# Patient Record
Sex: FEMALE
[De-identification: names, ages, dates, MRNs, and addresses within clinical notes are randomized per-mention and may not be internally consistent; named-entity substitution may affect disease eponyms.]

---

## 2018-04-20 ENCOUNTER — HOSPITAL ENCOUNTER (EMERGENCY)
Dept: HOSPITAL 97 - ER | Age: 43
LOS: 1 days | Discharge: HOME | End: 2018-04-21
Payer: COMMERCIAL

## 2018-04-20 DIAGNOSIS — L50.9: Primary | ICD-10-CM

## 2018-04-20 DIAGNOSIS — R06.00: ICD-10-CM

## 2018-04-20 DIAGNOSIS — E83.42: ICD-10-CM

## 2018-04-20 LAB
ALBUMIN SERPL BCP-MCNC: 3.2 G/DL (ref 3.2–5.5)
ALP SERPL-CCNC: 88 IU/L (ref 42–121)
ALT SERPL W P-5'-P-CCNC: 38 IU/L (ref 10–60)
AST SERPL W P-5'-P-CCNC: 39 IU/L (ref 10–42)
BUN BLD-MCNC: 16 MG/DL (ref 6–20)
CKMB CREATINE KINASE MB: 3.3 NG/ML (ref 0.3–4)
GLUCOSE SERPLBLD-MCNC: 108 MG/DL (ref 65–120)
HCT VFR BLD CALC: 36.1 % (ref 36–45)
INR BLD: 0.97
LIPASE SERPL-CCNC: 19 U/L (ref 22–51)
LYMPHOCYTES # SPEC AUTO: 2.6 K/UL (ref 0.7–4.9)
MAGNESIUM SERPL-MCNC: 1.7 MG/DL (ref 1.8–2.5)
MCH RBC QN AUTO: 26.7 PG (ref 27–35)
MCV RBC: 82.5 FL (ref 80–100)
PMV BLD: 8.1 FL (ref 7.6–11.3)
POTASSIUM SERPL-SCNC: 3.6 MEQ/L (ref 3.6–5)
RBC # BLD: 4.37 M/UL (ref 3.86–4.86)

## 2018-04-20 PROCEDURE — 83690 ASSAY OF LIPASE: CPT

## 2018-04-20 PROCEDURE — 80048 BASIC METABOLIC PNL TOTAL CA: CPT

## 2018-04-20 PROCEDURE — 87086 URINE CULTURE/COLONY COUNT: CPT

## 2018-04-20 PROCEDURE — 71045 X-RAY EXAM CHEST 1 VIEW: CPT

## 2018-04-20 PROCEDURE — 96375 TX/PRO/DX INJ NEW DRUG ADDON: CPT

## 2018-04-20 PROCEDURE — 85730 THROMBOPLASTIN TIME PARTIAL: CPT

## 2018-04-20 PROCEDURE — 85610 PROTHROMBIN TIME: CPT

## 2018-04-20 PROCEDURE — 36415 COLL VENOUS BLD VENIPUNCTURE: CPT

## 2018-04-20 PROCEDURE — 87088 URINE BACTERIA CULTURE: CPT

## 2018-04-20 PROCEDURE — 81003 URINALYSIS AUTO W/O SCOPE: CPT

## 2018-04-20 PROCEDURE — 93005 ELECTROCARDIOGRAM TRACING: CPT

## 2018-04-20 PROCEDURE — 85025 COMPLETE CBC W/AUTO DIFF WBC: CPT

## 2018-04-20 PROCEDURE — 82553 CREATINE MB FRACTION: CPT

## 2018-04-20 PROCEDURE — 83735 ASSAY OF MAGNESIUM: CPT

## 2018-04-20 PROCEDURE — 80076 HEPATIC FUNCTION PANEL: CPT

## 2018-04-20 PROCEDURE — 99284 EMERGENCY DEPT VISIT MOD MDM: CPT

## 2018-04-20 PROCEDURE — 84484 ASSAY OF TROPONIN QUANT: CPT

## 2018-04-20 PROCEDURE — 82550 ASSAY OF CK (CPK): CPT

## 2018-04-20 PROCEDURE — 96361 HYDRATE IV INFUSION ADD-ON: CPT

## 2018-04-20 PROCEDURE — 96365 THER/PROPH/DIAG IV INF INIT: CPT

## 2018-04-20 PROCEDURE — 83880 ASSAY OF NATRIURETIC PEPTIDE: CPT

## 2018-04-20 PROCEDURE — 81025 URINE PREGNANCY TEST: CPT

## 2018-04-20 NOTE — EDPHYS
Physician Documentation                                                                           

 [facility]                                                                

Name: Lyla Frank                                                                 

Age: 42 yrs                                                                                       

Sex: Female                                                                                       

: 1975                                                                                   

MRN: [id number]                                                                                   

Arrival Date: 2018                                                                          

Time: 21:25                                                                                       

Account#: [id number]                                                                            

Bed 6                                                                                             

Private MD:                                                                                       

ED Physician Yusef De La Rosa                                                                      

HPI:                                                                                              

                                                                                             

21:50 This 42 yrs old  Female presents to ER via Ambulatory with complaints of Rash,  michael 

      Breathing Difficulty.                                                                       

21:50 The patient's rash thought to be caused by an unknown cause. The rash is located on the michael 

      body diffusely. The rash can be described as erythematous, urticarial. Onset: The           

      symptoms/episode began/occurred 1 day(s) ago. Associated signs and symptoms: Pertinent      

      positives: None. Severity of symptoms: At their worst the symptoms were moderate in the     

      emergency department the symptoms are unchanged. Treatment given at home: none. The         

      patient has not experienced similar symptoms in the past.                                   

                                                                                                  

OB/GYN:                                                                                           

21:30 LMP N/A - Recent birth                                                                  aj  

                                                                                                  

Historical:                                                                                       

- Allergies:                                                                                      

21:30 No Known Allergies;                                                                     aj  

- Home Meds:                                                                                      

21:30 None [Active];                                                                          aj  

- PMHx:                                                                                           

21:30 None;                                                                                   aj  

- PSHx:                                                                                           

21:30 None;                                                                                   aj  

                                                                                                  

- Immunization history:: Adult Immunizations up to date.                                          

- Social history:: Smoking status: Patient/guardian denies using tobacco.                         

- Family history:: not pertinent.                                                                 

                                                                                                  

                                                                                                  

ROS:                                                                                              

21:50 Constitutional: Negative for fever, chills, and weight loss, Eyes: Negative for injury, michael 

      pain, redness, and discharge, ENT: Negative for injury, pain, and discharge, Neck:          

      Negative for injury, pain, and swelling, Cardiovascular: Negative for chest pain,           

      palpitations, and edema, Respiratory: Negative for shortness of breath, cough,              

      wheezing, and pleuritic chest pain, Abdomen/GI: Negative for abdominal pain, nausea,        

      vomiting, diarrhea, and constipation, Back: Negative for injury and pain, : Negative      

      for injury, bleeding, discharge, and swelling, MS/Extremity: Negative for injury and        

      deformity, Neuro: Negative for headache, weakness, numbness, tingling, and seizure,         

      Psych: Negative for depression, anxiety, suicide ideation, homicidal ideation, and          

      hallucinations, Allergy/Immunology: Negative for hives, rash, and allergies, Endocrine:     

      Negative for neck swelling, polydipsia, polyuria, polyphagia, and marked weight             

      changes, Hematologic/Lymphatic: Negative for swollen nodes, abnormal bleeding, and          

      unusual bruising.                                                                           

21:50 Skin: Positive for rash, diffusely.                                                         

                                                                                                  

Exam:                                                                                             

21:50 Constitutional:  This is a well developed, well nourished patient who is awake, alert,  michael 

      and in no acute distress. Head/Face:  Normocephalic, atraumatic. Eyes:  Pupils equal        

      round and reactive to light, extra-ocular motions intact.  Lids and lashes normal.          

      Conjunctiva and sclera are non-icteric and not injected.  Cornea within normal limits.      

      Periorbital areas with no swelling, redness, or edema. ENT:  Nares patent. No nasal         

      discharge, no septal abnormalities noted.  Tympanic membranes are normal and external       

      auditory canals are clear.  Oropharynx with no redness, swelling, or masses, exudates,      

      or evidence of obstruction, uvula midline.  Mucous membranes moist. Neck:  Trachea          

      midline, no thyromegaly or masses palpated, and no cervical lymphadenopathy.  Supple,       

      full range of motion without nuchal rigidity, or vertebral point tenderness.  No            

      Meningismus. Chest/axilla:  Normal chest wall appearance and motion.  Nontender with no     

      deformity.  No lesions are appreciated. Cardiovascular:  Regular rate and rhythm with a     

      normal S1 and S2.  No gallops, murmurs, or rubs.  Normal PMI, no JVD.  No pulse             

      deficits. Respiratory:  Lungs have equal breath sounds bilaterally, clear to                

      auscultation and percussion.  No rales, rhonchi or wheezes noted.  No increased work of     

      breathing, no retractions or nasal flaring. Abdomen/GI:  Soft, non-tender, with normal      

      bowel sounds.  No distension or tympany.  No guarding or rebound.  No evidence of           

      tenderness throughout. Back:  No spinal tenderness.  No costovertebral tenderness.          

      Full range of motion. MS/ Extremity:  Pulses equal, no cyanosis.  Neurovascular intact.     

       Full, normal range of motion. Neuro:  Awake and alert, GCS 15, oriented to person,         

      place, time, and situation.  Cranial nerves II-XII grossly intact.  Motor strength 5/5      

      in all extremities.  Sensory grossly intact.  Cerebellar exam normal.  Normal gait.         

      Psych:  Awake, alert, with orientation to person, place and time.  Behavior, mood, and      

      affect are within normal limits.                                                            

21:50 Skin: Appearance: Color: normal in color, Temperature: normal temperature, Moisture:        

      normal moisture, petechiae, not noted, ecchymosis, not noted, urticaria.                    

23:01 Musculoskeletal/extremity: Extremities: all appear grossly normal, with no appreciated  michael 

      pain with palpation, ROM: full active range of motion, full passive range of motion,        

      Circulation is intact in all extremities. Sensation intact. Compartment Syndrome exam       

      of affected extremity: is normal. DVT Exam: No signs of deep vein thrombosis. no pain,      

      no swelling, no tenderness, negative Homans' sign noted on exam, no appreciated bluish      

      discoloration, no erythema, no increased warmth.                                            

23:04 Chest/axilla: Inspection: normal, Palpation: is normal, no acute changes, Axilla: are   michael 

      normal, no acute changes.                                                                   

23:04 Cardiovascular: Rate: normal, Rhythm: regular, Pulses: Pulses are 4+ in bilateral           

      radial, brachial, femoral, popliteal, posterior tibial and and dorsalis pedis               

      arteries.. Heart sounds: normal, Edema: is not appreciated, JVD: is not appreciated.        

                                                                                                  

Vital Signs:                                                                                      

21:30  / 83; Pulse 79; Resp 22; Temp 98.7; Pulse Ox 97% on R/A; Weight 122.47 kg;       aj  

      Height 5 ft. 3 in. (160.02 cm); Pain 0/10;                                                  

22:58  / 80; Pulse 67; Resp 16; Pulse Ox 95% on R/A; Pain 0/10;                         tl1 

                                                                                             

00:11  / 79; Pulse 65; Resp 16; Temp 98.6; Pulse Ox 96% on R/A; Pain 0/10;              tl1 

                                                                                             

21:30 Body Mass Index 47.83 (122.47 kg, 160.02 cm)                                              

                                                                                                  

MDM:                                                                                              

                                                                                             

21:46 Patient medically screened.                                                             [facility] 

21:52 Data reviewed: vital signs, nurses notes, lab test result(s), EKG, radiologic studies,  [facility] 

      plain films.                                                                                

                                                                                                  

                                                                                             

21:50 Order name: Basic Metabolic Panel; Complete Time: 22:57                                 [facility] 

                                                                                             

21:50 Order name: BNP; Complete Time: 22:57                                                   [facility] 

                                                                                             

21:50 Order name: CBC with Diff; Complete Time: 22:57                                         [facility] 

                                                                                             

21:50 Order name: Ckmb; Complete Time: 22:57                                                  [facility] 

                                                                                             

21:50 Order name: CPK; Complete Time: 22:57                                                   [facility] 

                                                                                             

21:50 Order name: LFT's; Complete Time: 22:57                                                 [facility] 

                                                                                             

21:50 Order name: Magnesium; Complete Time: 22:57                                             [facility] 

                                                                                             

21:50 Order name: PT-INR; Complete Time: 22:57                                                [facility] 

                                                                                             

21:50 Order name: Ptt, Activated; Complete Time: 22:57                                        [facility] 

                                                                                             

21:50 Order name: Troponin (emerg Dept Use Only); Complete Time: 22:57                        [facility] 

                                                                                             

21:50 Order name: Lipase; Complete Time: 22:57                                                [facility] 

                                                                                             

21:53 Order name: Urine Culture                                                               [facility] 

                                                                                             

23:42 Order name: Urine Dipstick--Ancillary (enter results)                                   [facility] 

                                                                                             

23:42 Order name: Urine Pregnancy--Ancillary (enter results)                                  [facility] 

                                                                                             

21:50 Order name: XRAY Chest (1 view)                                                         [facility] 

                                                                                             

21:50 Order name: EKG; Complete Time: 21:51                                                   [facility] 

                                                                                             

21:50 Order name: Cardiac monitoring; Complete Time: 21:55                                    [facility] 

                                                                                             

21:50 Order name: EKG - Nurse/Tech; Complete Time: 22:18                                      [facility] 

                                                                                             

21:50 Order name: IV Saline Lock; Complete Time: 21:55                                        [facility] 

                                                                                             

21:50 Order name: Labs collected and sent; Complete Time: 21:55                               [facility] 

                                                                                             

21:50 Order name: O2 Per Protocol; Complete Time: 21:56                                       [facility] 

                                                                                             

21:50 Order name: O2 Sat Monitoring; Complete Time: 21:56                                     [facility] 

                                                                                             

21:50 Order name: Urine Dipstick-Ancillary (obtain specimen); Complete Time: 23:40            [facility] 

                                                                                                  

Administered Medications:                                                                         

22:09 Drug: SOLU-Medrol 125 mg Route: IVP; Infused Over: 2 mins; Site: right hand;                                                                                                         

00:06 Follow up: Response: No adverse reaction; Marked relief of symptoms                                                                                                                  

22:09 Drug: predniSONE 60 mg Route: PO;                                                                                                                                                    

00:07 Follow up: Response: No adverse reaction; Marked relief of symptoms                                                                                                                  

22:10 Drug: NS 0.9% 1000 ml Route: IV; Rate: 125 ml/hr; Site: right hand;                     21                                                                                             

00:06 Follow up: IV Status: Completed infusion                                                tl1 

                                                                                             

22:10 Drug: Benadryl 50 mg Route: IVP; Infused Over: 3 mins; Site: right hand;                tl1 

                                                                                             

00:06 Follow up: Response: No adverse reaction; Marked relief of symptoms                     tl1 

                                                                                             

22:10 Drug: Pepcid 20 mg Route: IVP; Infused Over: 2 mins; Site: right hand;                  tl1 

                                                                                             

00:06 Follow up: Response: No adverse reaction; Marked relief of symptoms                     1 

                                                                                             

23:03 Drug: Magnesium Sulfate 1 grams Route: IVPB; Infused Over: 1 hrs; Site: right           tl1 

      antecubital;                                                                                

                                                                                             

00:05 Follow up: IV Status: Completed infusion                                                1 

                                                                                                  

                                                                                                  

Disposition:                                                                                      

18 23:00 Discharged to Home. Impression: Rash and other nonspecific skin eruption,          

  Urticaria, Hypomagnesemia, Dyspnea.                                                             

- Condition is Stable.                                                                            

- Discharge Instructions: Hives, Hypomagnesemia, Rash, Shortness of Breath, Shortness             

  of Breath, Easy-to-Read, Rash, Easy-to-Read, Hives, Easy-to-Read.                               

- Prescriptions for Benadryl 25 mg Oral Capsule - take 1 capsule by ORAL route every 6            

  hours As needed; 30 tablet. Pepcid 20 mg Oral Tablet - take 1 tablet by ORAL route              

  every 12 hours for 5 days; 20 tablet. Prednisone 20 mg Oral Tablet - take 2 tablet by           

  ORAL route once daily for 5 days; 10 tablet.                                                    

- Medication Reconciliation Form, Thank You Letter, Antibiotic Education, Prescription            

  Opioid Use form.                                                                                

- Follow up: Private Physician; When: 2 - 3 days; Reason: Recheck today's complaints,             

  Continuance of care, Re-evaluation by your physician.                                           

- Problem is new.                                                                                 

- Symptoms have improved.                                                                         

                                                                                                  

                                                                                                  

                                                                                                  

Signatures:                                                                                       

Dispatcher [employer]                           Madelin Alvarado RN RN aj Anderson, Corey, MD MD cha Lasagna, Tonya, RN                      RN   tl1                                                  

                                                                                                  

**************************************************************************************************

## 2018-04-20 NOTE — ER
Nurse's Notes                                                                                     

 [facility]                                                                

Name: Lyla Frank                                                                 

Age: 42 yrs                                                                                       

Sex: Female                                                                                       

: 1975                                                                                   

MRN: [id number]                                                                                   

Arrival Date: 2018                                                                          

Time: 21:25                                                                                       

Account#: [id number]                                                                            

Bed 6                                                                                             

Private MD:                                                                                       

Diagnosis: Rash and other nonspecific skin eruption;Urticaria;Hypomagnesemia;Dyspnea              

                                                                                                  

Presentation:                                                                                     

                                                                                             

21:27 Presenting complaint: Patient states: Itchy rash all over that started yesterday.       aj  

      Patient reports taking Benadryl 25 mg at 1600 today. Patient reports "bumps" on tongue      

      and throat. Recent birth 10 days ago. Transition of care: patient was not received from     

      another setting of care. Onset of symptoms was 2018. Initial Sepsis Screen:       

      Does the patient meet any 2 criteria? No. Patient's initial sepsis screen is negative.      

      Does the patient have a suspected source of infection? No. Patient's initial sepsis         

      screen is negative. Care prior to arrival: None.                                            

21:27 Method Of Arrival: Ambulatory                                                             

21:27 Acuity: NEERU 3                                                                           aj  

                                                                                                  

Triage Assessment:                                                                                

21:30 General: Appears in no apparent distress. uncomfortable, Behavior is calm, cooperative, aj  

      appropriate for age. Pain: Denies pain. EENT: Reports pain when swallowing. Neuro:          

      Level of Consciousness is awake, alert, obeys commands, Oriented to person, place,          

      time, situation. Respiratory: Reports shortness of breath Airway is patent Respiratory      

      effort is even, unlabored, Respiratory pattern is regular, symmetrical, Onset: The          

      symptoms/episode began/occurred gradually, the patient has mild shortness of breath.        

      Derm: Skin is intact, is healthy with good turgor, Skin is pink, warm \T\ dry. normal,      

      Rash noted that is itchy, red, on face, chest, abdomen, right arm, left arm, right leg      

      and left leg.                                                                               

                                                                                                  

OB/GYN:                                                                                           

21:30 LMP N/A - Recent birth                                                                  aj  

                                                                                                  

Historical:                                                                                       

- Allergies:                                                                                      

21:30 No Known Allergies;                                                                     aj  

- Home Meds:                                                                                      

21:30 None [Active];                                                                          aj  

- PMHx:                                                                                           

21:30 None;                                                                                   aj  

- PSHx:                                                                                           

21:30 None;                                                                                   aj  

                                                                                                  

- Immunization history:: Adult Immunizations up to date.                                          

- Social history:: Smoking status: Patient/guardian denies using tobacco.                         

- Family history:: not pertinent.                                                                 

                                                                                                  

                                                                                                  

Screenin/21                                                                                             

00:08 Abuse screen: Denies threats or abuse. Denies injuries from another. Nutritional        tl1 

      screening: No deficits noted. Tuberculosis screening: No symptoms or risk factors           

      identified. Fall Risk IV access (20 points).                                                

                                                                                                  

Assessment:                                                                                       

                                                                                             

21:44 General: Appears in no apparent distress. Behavior is calm, cooperative, appropriate    tl1 

      for age. Pain: Denies pain. Neuro: Level of Consciousness is awake, alert, Oriented to      

      person, place, time, situation.                                                             

21:45 Cardiovascular: Denies chest pain, Rhythm is regular. Respiratory: Airway is patent     tl1 

      Trachea midline Respiratory effort is even, unlabored, Breath sounds are clear              

      bilaterally. GI: Abdomen is non-distended, Bowel sounds present X 4 quads. Abd is soft      

      and non tender X 4 quads. : Reports vaginal bleeding that is moderate flow, Patient       

      gave birth approx 10 days PTA. EENT: Reports bumps on tongue and throat. Derm: Rash         

      noted that is itchy, red, raised, urticaria.                                                

23:04 Reassessment: Patient and/or family updated on plan of care and expected duration. Pain tl1 

      level reassessed. Patient is alert, oriented x 3, equal unlabored respirations, skin        

      warm/dry/pink. Patient denies pain at this time. Patient states feeling better. Patient     

      states symptoms have improved.                                                              

                                                                                             

00:07 Reassessment: Patient and/or family updated on plan of care and expected duration. Pain tl1 

      level reassessed. Patient is alert, oriented x 3, equal unlabored respirations, skin        

      warm/dry/pink. Patient denies pain at this time. Patient states feeling better. Patient     

      states symptoms have improved. Respiratory: Airway is patent Trachea midline                

      Respiratory effort is even, unlabored.                                                      

                                                                                                  

Vital Signs:                                                                                      

                                                                                             

21:30  / 83; Pulse 79; Resp 22; Temp 98.7; Pulse Ox 97% on R/A; Weight 122.47 kg;       aj  

      Height 5 ft. 3 in. (160.02 cm); Pain 0/10;                                                  

22:58  / 80; Pulse 67; Resp 16; Pulse Ox 95% on R/A; Pain 0/10;                         tl1 

                                                                                             

00:11  / 79; Pulse 65; Resp 16; Temp 98.6; Pulse Ox 96% on R/A; Pain 0/10;              tl1 

                                                                                             

21:30 Body Mass Index 47.83 (122.47 kg, 160.02 cm)                                            aj  

                                                                                                  

ED Course:                                                                                        

                                                                                             

21:25 Patient arrived in ED.                                                                  al2 

21:29 Triage completed.                                                                       aj  

21:30 Arm band placed on right wrist. Patient placed in an exam room.                         aj  

21:30 Patient has correct armband on for positive identification. Bed in low position. Call   tl1 

      light in reach. Side rails up X 1.                                                          

21:43 Inserted saline lock: 22 gauge in right hand, using aseptic technique. Blood collected. tl2 

21:44 Savi Victoria, AMBER is Primary Nurse.                                                    tl1 

21:45 Yusef De La Rosa MD is Attending Physician.                                             michael 

22:15 X-ray completed. Portable x-ray completed in exam room. Patient tolerated procedure     kp1 

      well.                                                                                       

22:17 XRAY Chest (1 view) In Process Unspecified.                                             EDMS

22:27 EKG done, by ED staff, reviewed by Yusef De La Rosa MD.                                   cb2 

23:43 Urine collected: clean catch specimen, clear.                                           cb2 

                                                                                             

00:09 No provider procedures requiring assistance completed. IV discontinued, intact,         tl1 

      bleeding controlled, No redness/swelling at site. Pressure dressing applied.                

                                                                                                  

Administered Medications:                                                                         

                                                                                             

22:09 Drug: SOLU-Medrol 125 mg Route: IVP; Infused Over: 2 mins; Site: right hand;            tl1 

                                                                                             

00:06 Follow up: Response: No adverse reaction; Marked relief of symptoms                     tl1 

                                                                                             

22:09 Drug: predniSONE 60 mg Route: PO;                                                       tl1 

                                                                                             

00:07 Follow up: Response: No adverse reaction; Marked relief of symptoms                     tl1 

                                                                                             

22:10 Drug: NS 0.9% 1000 ml Route: IV; Rate: 125 ml/hr; Site: right hand;                     tl1 

                                                                                             

00:06 Follow up: IV Status: Completed infusion                                                tl1 

                                                                                             

22:10 Drug: Benadryl 50 mg Route: IVP; Infused Over: 3 mins; Site: right hand;                tl1 

                                                                                             

00:06 Follow up: Response: No adverse reaction; Marked relief of symptoms                     tl1 

                                                                                             

22:10 Drug: Pepcid 20 mg Route: IVP; Infused Over: 2 mins; Site: right hand;                  tl1 

                                                                                             

00:06 Follow up: Response: No adverse reaction; Marked relief of symptoms                     tl1 

                                                                                             

23:03 Drug: Magnesium Sulfate 1 grams Route: IVPB; Infused Over: 1 hrs; Site: right           tl1 

      antecubital;                                                                                

                                                                                             

00:05 Follow up: IV Status: Completed infusion                                                tl1 

                                                                                                  

                                                                                                  

Outcome:                                                                                          

                                                                                             

23:00 Discharge ordered by MD.                                                                michael 

                                                                                             

00:07 Discharged to home ambulatory, with family.                                             tl1 

      Condition: good                                                                             

      Discharge instructions given to patient, family, Instructed on discharge instructions,      

      follow up and referral plans. medication usage, Demonstrated understanding of               

      instructions, follow-up care, medications, Prescriptions given X 3.                         

00:12 Patient left the ED.                                                                    tl1 

                                                                                                  

Signatures:                                                                                       

Dispatcher [employer]                           EDMS                                                 

Madelin Butterfield RN RN aj Anderson, Corey, MD MD cha Lasagna, Tonya, RN                      RN   tl1                                                  

Danisha Martinez RN RN   tl2                                                  

Sudheer Odell Kathy                                 1                                                  

Carolyn Smith2                                                  

                                                                                                  

**************************************************************************************************

## 2018-04-21 NOTE — RAD REPORT
EXAM DESCRIPTION:  [facility] Single View4/20/2018 10:18 pm

 

CLINICAL HISTORY:  Shortness of breath

 

COMPARISON:  none

 

FINDINGS:   The lungs appear grossly clear. The heart is normal size

 

IMPRESSION:   No acute abnormalities displayed

## 2018-04-21 NOTE — EKG
Test Date:    2018-04-20               Test Time:    22:22:50

Technician:   BALA                                     

                                                     

MEASUREMENT RESULTS:                                       

Intervals:                                           

Rate:         69                                     

CO:           148                                    

QRSD:         74                                     

QT:           418                                    

QTc:          447                                    

Axis:                                                

P:            61                                     

CO:           148                                    

QRS:          22                                     

T:            48                                     

                                                     

INTERPRETIVE STATEMENTS:                                       

                                                     

Normal sinus rhythm

Normal ECG

No previous ECG available for comparison



Electronically Signed On 04-21-18 07:14:51 CDT by Musa Hitchcock

## 2018-04-25 ENCOUNTER — HOSPITAL ENCOUNTER (EMERGENCY)
Dept: HOSPITAL 97 - ER | Age: 43
Discharge: HOME | End: 2018-04-25
Payer: COMMERCIAL

## 2018-04-25 DIAGNOSIS — L50.9: ICD-10-CM

## 2018-04-25 DIAGNOSIS — L02.211: Primary | ICD-10-CM

## 2018-04-25 PROCEDURE — 99283 EMERGENCY DEPT VISIT LOW MDM: CPT

## 2018-04-25 PROCEDURE — 0H97XZZ DRAINAGE OF ABDOMEN SKIN, EXTERNAL APPROACH: ICD-10-PCS

## 2018-04-25 NOTE — EDPHYS
Physician Documentation                                                                           

 [facility]                                                                

Name: Lyla Frank                                                                 

Age: 42 yrs                                                                                       

Sex: Female                                                                                       

: 1975                                                                                   

MRN: [id number]                                                                                   

Arrival Date: 2018                                                                          

Time: 08:59                                                                                       

Account#: [id number]                                                                            

Bed 14                                                                                            

Private MD:                                                                                       

ED Physician Eagle Nelson                                                                         

HPI:                                                                                              

                                                                                             

09:24 This 42 yrs old  Female presents to ER via Ambulatory with complaints of Rash.  kb  

09:24 The patient's rash thought to be caused by an unknown cause. The rash is located on the kb  

      body diffusely. The rash can be described as urticarial. Onset: The symptoms/episode        

      began/occurred 5 day(s) ago. Associated signs and symptoms: Pertinent positives:            

      itching. Severity of symptoms: At their worst the symptoms were moderate in the             

      emergency department the symptoms are unchanged. The patient has not experienced            

      similar symptoms in the past. The patient has been recently seen at the [facility] Emergency Department, last week, for similar complaints labs         

      were performed, X-rays were performed. Pt was seen for rash a few days ago and started      

      on prednisone and pepcid. States the rash subsides throughout the day, but comes back       

      at night. Has not been taking benadryl. Also reports abscess to LLQ that drains when        

      she pushes on it. .                                                                         

                                                                                                  

Historical:                                                                                       

- Allergies:                                                                                      

09:23 No Known Allergies;                                                                     jl7 

- Home Meds:                                                                                      

09:23 None [Active];                                                                          jl7 

- PMHx:                                                                                           

09:23 None;                                                                                   jl7 

- PSHx:                                                                                           

09:23 None;                                                                                   jl7 

                                                                                                  

- Immunization history:: Adult Immunizations unknown.                                             

- Social history:: Smoking status: unknown.                                                       

                                                                                                  

                                                                                                  

ROS:                                                                                              

09:21 Constitutional: Negative for fever, chills, and weight loss, Cardiovascular: Negative   kb  

      for chest pain, palpitations, and edema, Respiratory: Negative for shortness of breath,     

      cough, wheezing, and pleuritic chest pain, Abdomen/GI: Negative for abdominal pain,         

      nausea, vomiting, diarrhea, and constipation, MS/Extremity: Negative for injury and         

      deformity, Neuro: Negative for headache, weakness, numbness, tingling, and seizure.         

09:21 Skin: Positive for rash, diffusely.                                                         

09:23 Skin: Positive for abscess, erythema, swelling, of the left lower quadrant.             kb  

                                                                                                  

Exam:                                                                                             

09:23 Constitutional:  This is a well developed, well nourished patient who is awake, alert,  kb  

      and in no acute distress. Head/Face:  Normocephalic, atraumatic. Chest/axilla:  Normal      

      chest wall appearance and motion.  Nontender with no deformity.  No lesions are             

      appreciated. Cardiovascular:  Regular rate and rhythm with a normal S1 and S2.  No          

      gallops, murmurs, or rubs.  Normal PMI, no JVD.  No pulse deficits. Respiratory:  Lungs     

      have equal breath sounds bilaterally, clear to auscultation and percussion.  No rales,      

      rhonchi or wheezes noted.  No increased work of breathing, no retractions or nasal          

      flaring. Abdomen/GI:  Soft, non-tender, with normal bowel sounds.  No distension or         

      tympany.  No guarding or rebound.  No evidence of tenderness throughout. MS/ Extremity:     

       Pulses equal, no cyanosis.  Neurovascular intact.  Full, normal range of motion.           

      Neuro:  Awake and alert, GCS 15, oriented to person, place, time, and situation.            

      Cranial nerves II-XII grossly intact.  Motor strength 5/5 in all extremities.  Sensory      

      grossly intact.  Cerebellar exam normal.  Normal gait.                                      

09:23 Skin: abscess, that is small, of the left lower quadrant, with drainage, with               

      fluctuance, with induration, with surrounding cellulitis, that is mild, consistent with     

       urticaria.                                                                                 

                                                                                                  

Vital Signs:                                                                                      

09:23  / 85; Pulse 72; Resp 16; Temp 98.3; Pulse Ox 97% ; Weight 102.06 kg; Height 5    jl7 

      ft. 3 in. (160.02 cm);                                                                      

10:00  / 76; Pulse 58; Resp 16; Pulse Ox 95% ;                                          jl7 

10:45  / 84; Pulse 65; Resp 16 S; Pulse Ox 95% on R/A;                                  jl7 

09:23 Body Mass Index 39.86 (102.06 kg, 160.02 cm)                                            7 

                                                                                                  

Procedures:                                                                                       

10:22 I \T\ D: Incision and drainage was performed for an abscess of the left left lower        kb

      quadrant Prepped with alcohol, Anesthetized with 2 ml's 1% Lidocaine. Incised with #11      

      blade. Drained small amount serosanguinous fluid. Dressing: sterile 4x4 gauze, the          

      patient tolerated the procedure well.                                                       

                                                                                                  

MDM:                                                                                              

09:11 Patient medically screened.                                                               

09:21 Data reviewed: vital signs, nurses notes. Data interpreted: Pulse oximetry: on room air kb  

      is 96 %. Interpretation: normal.                                                            

10:22 Counseling: I had a detailed discussion with the patient and/or guardian regarding: the kb  

      historical points, exam findings, and any diagnostic results supporting the                 

      discharge/admit diagnosis, the need for outpatient follow up, an allergy/immunology         

      specialist, a family practitioner, a general surgeon, to return to the emergency            

      department if symptoms worsen or persist or if there are any questions or concerns that     

      arise at home.                                                                              

                                                                                                  

                                                                                             

09:19 Order name: I\T\D Setup; Complete Time: :31                                             kb

                                                                                                  

Administered Medications:                                                                         

: Drug: Benadryl 50 mg Route: PO;                                                         jl7 

10:15 Follow up: Response: No adverse reaction; Marked relief of symptoms                     jl7 

10:00 Drug: Lidocaine (1 %) 1 vials {Note: administered by ESTHER Saeed} Volume: 5 ml; Route: jl7 

      Infiltration;                                                                               

10:40 Follow up: Response: No adverse reaction                                                jl7 

10:40 Drug: Bactrim (160 mg-800 mg (DS) 1 tablet Route: PO;                                   jl7 

10:56 Follow up: Response: Medication administered at discharge.                              jl7 

10:40 Drug: Ibuprofen 800 mg Route: PO;                                                       jl7 

10:57 Follow up: Response: Medication administered at discharge.                              jl7 

                                                                                                  

                                                                                                  

Disposition:                                                                                      

16:36 Co-signature as Attending Physician, Eagle Nelson MD.                                    rn  

                                                                                                  

Disposition:                                                                                      

18 10:32 Discharged to Home. Impression: Urticaria, Cutaneous abscess of abdominal wall.    

- Condition is Stable.                                                                            

- Discharge Instructions: Abscess, Easy-to-Read, Hives, Easy-to-Read.                             

- Prescriptions for Bactrim - 160 mg Oral Tablet - take 1 tablet by ORAL route              

  every 12 hours for 7 days; 14 tablet.                                                           

- Medication Reconciliation Form, Thank You Letter, Antibiotic Education, Prescription            

  Opioid Use form.                                                                                

- Follow up: Emergency Department; When: As needed; Reason: Worsening of condition.               

  Follow up: Private Physician; When: 2 - 3 days; Reason: Recheck today's complaints,             

  Continuance of care, Re-evaluation by your physician.                                           

                                                                                                  

                                                                                                  

                                                                                                  

Signatures:                                                                                       

Sharri Saeed, JANP-C                 DURGA-Eagle Patricio MD MD rn Smirch, Shelby, RN RN ss Leal, Jahala, RN                        RN   jl7                                                  

                                                                                                  

Corrections: (The following items were deleted from the chart)                                    

09:21 09:21 Data interpreted: Pulse oximetry: on room air is 100 %. Interpretation: normal. kbkb  

09:26 09:24 The patient has been recently seen at the [facility]  

      Emergency Department, last week, for similar complaints kb                                  

                                                                                                  

**************************************************************************************************

## 2018-04-25 NOTE — ER
Nurse's Notes                                                                                     

 [facility]                                                                

Name: Lyla Frank                                                                 

Age: 42 yrs                                                                                       

Sex: Female                                                                                       

: 1975                                                                                   

MRN: [id number]                                                                                   

Arrival Date: 2018                                                                          

Time: 08:59                                                                                       

Account#: [id number]                                                                            

Bed 14                                                                                            

Private MD:                                                                                       

Diagnosis: Urticaria;Cutaneous abscess of abdominal wall                                          

                                                                                                  

Presentation:                                                                                     

                                                                                             

09:19 Presenting complaint: Patient states: Continued rash from Saturday, meds haven't        jl7 

      helped. Now there's an abscess to left upper abdomen. Transition of care: patient was       

      not received from another setting of care. Onset of symptoms was 2018.            

      Initial Sepsis Screen: Does the patient meet any 2 criteria? No. Patient's initial          

      sepsis screen is negative. Does the patient have a suspected source of infection? No.       

      Patient's initial sepsis screen is negative. Care prior to arrival: None.                   

09:19 Method Of Arrival: Ambulatory                                                           jl7 

09:19 Acuity: NEERU 4                                                                           jl7 

                                                                                                  

Historical:                                                                                       

- Allergies:                                                                                      

09:23 No Known Allergies;                                                                     jl7 

- Home Meds:                                                                                      

09:23 None [Active];                                                                          jl7 

- PMHx:                                                                                           

09:23 None;                                                                                   jl7 

- PSHx:                                                                                           

09:23 None;                                                                                   jl7 

                                                                                                  

- Immunization history:: Adult Immunizations unknown.                                             

- Social history:: Smoking status: unknown.                                                       

                                                                                                  

                                                                                                  

Screenin:30 Abuse screen: Denies threats or abuse. Denies injuries from another. Nutritional        jl7 

      screening: No deficits noted. Tuberculosis screening: No symptoms or risk factors           

      identified. Fall Risk None identified.                                                      

                                                                                                  

Assessment:                                                                                       

09:30 General: Appears in no apparent distress. Pain: Complains of pain in abdomen. Neuro:    jl7 

      Level of Consciousness is awake, alert, obeys commands. Cardiovascular: Patient's skin      

      is warm and dry. Respiratory: Airway is patent Respiratory effort is even, unlabored,       

      Respiratory pattern is regular, symmetrical. Derm: Rash noted that is urticaria, on         

      abdomen, right leg and left leg.                                                            

                                                                                                  

Vital Signs:                                                                                      

09:23  / 85; Pulse 72; Resp 16; Temp 98.3; Pulse Ox 97% ; Weight 102.06 kg; Height 5    jl7 

      ft. 3 in. (160.02 cm);                                                                      

10:00  / 76; Pulse 58; Resp 16; Pulse Ox 95% ;                                          jl7 

10:45  / 84; Pulse 65; Resp 16 S; Pulse Ox 95% on R/A;                                  jl7 

09:23 Body Mass Index 39.86 (102.06 kg, 160.02 cm)                                            jl7 

                                                                                                  

ED Course:                                                                                        

08:59 Patient arrived in ED.                                                                  as  

09:11 [facility]C [facility]                                                        kb  

09:11 Eagle Nelson MD is Attending Physician.                                                kb  

09:18 Lizbeth Rausch, RN is Primary Nurse.                                                      jl7 

09:21 Triage completed.                                                                       jl7 

09:25 Arm band placed on right wrist.                                                         jl7 

09:30 Patient has correct armband on for positive identification. Bed in low position. Call   jl7 

      light in reach. Side rails up X 1. Pulse ox on. NIBP on.                                    

11:00 No provider procedures requiring assistance completed. Patient did not have IV access   jl7 

      during this emergency room visit.                                                           

                                                                                                  

Administered Medications:                                                                         

09:31 Drug: Benadryl 50 mg Route: PO;                                                         jl7 

10:15 Follow up: Response: No adverse reaction; Marked relief of symptoms                     jl7 

10:00 Drug: Lidocaine (1 %) 1 vials {Note: administered by ESTHER Saeed} Volume: 5 ml; Route: jl7 

      Infiltration;                                                                               

10:40 Follow up: Response: No adverse reaction                                                jl7 

10:40 Drug: Bactrim (160 mg-800 mg (DS) 1 tablet Route: PO;                                   jl7 

10:56 Follow up: Response: Medication administered at discharge.                              jl7 

10:40 Drug: Ibuprofen 800 mg Route: PO;                                                       jl7 

10:57 Follow up: Response: Medication administered at discharge.                              jl7 

                                                                                                  

                                                                                                  

Outcome:                                                                                          

10:32 Discharge ordered by MD.                                                                kb  

11:00 Discharged to home ambulatory.                                                          jl7 

11:00 Condition: stable                                                                           

11:00 Discharge instructions given to patient, family, Instructed on discharge instructions,      

      follow up and referral plans. medication usage, Demonstrated understanding of               

      instructions, follow-up care, medications, Prescriptions given X 1.                         

11:01 Patient left the ED.                                                                    jl7 

                                                                                                  

Signatures:                                                                                       

Sharri Saeed FNP-C FNP-Johnna Archibald                             as                                                   

Lizbeth Rausch, RN                        RN   jl7                                                  

                                                                                                  

**************************************************************************************************